# Patient Record
Sex: FEMALE | Race: WHITE | NOT HISPANIC OR LATINO | URBAN - METROPOLITAN AREA
[De-identification: names, ages, dates, MRNs, and addresses within clinical notes are randomized per-mention and may not be internally consistent; named-entity substitution may affect disease eponyms.]

---

## 2021-04-05 ENCOUNTER — APPOINTMENT (OUTPATIENT)
Age: 86
Setting detail: DERMATOLOGY
End: 2021-04-07

## 2021-04-05 DIAGNOSIS — L82.1 OTHER SEBORRHEIC KERATOSIS: ICD-10-CM

## 2021-04-05 DIAGNOSIS — Z86.19 PERSONAL HISTORY OF OTHER INFECTIOUS AND PARASITIC DISEASES: ICD-10-CM

## 2021-04-05 DIAGNOSIS — L23.9 ALLERGIC CONTACT DERMATITIS, UNSPECIFIED CAUSE: ICD-10-CM

## 2021-04-05 DIAGNOSIS — L57.8 OTHER SKIN CHANGES DUE TO CHRONIC EXPOSURE TO NONIONIZING RADIATION: ICD-10-CM

## 2021-04-05 PROBLEM — C44.729 SQUAMOUS CELL CARCINOMA OF SKIN OF LEFT LOWER LIMB, INCLUDING HIP: Status: ACTIVE | Noted: 2021-04-05

## 2021-04-05 PROCEDURE — OTHER COUNSELING: OTHER

## 2021-04-05 PROCEDURE — OTHER CONSULTATION FOR MOHS SURGERY: OTHER

## 2021-04-05 PROCEDURE — OTHER OTHER: OTHER

## 2021-04-05 PROCEDURE — OTHER MIPS QUALITY: OTHER

## 2021-04-05 PROCEDURE — OTHER PRESCRIPTION: OTHER

## 2021-04-05 PROCEDURE — 99203 OFFICE O/P NEW LOW 30 MIN: CPT

## 2021-04-05 RX ORDER — FLUOCINOLONE ACETONIDE 0.1 MG/ML
SOLUTION TOPICAL
Qty: 1 | Refills: 2 | Status: ERX | COMMUNITY
Start: 2021-04-05

## 2021-04-05 ASSESSMENT — PAIN INTENSITY VAS: HOW INTENSE IS YOUR PAIN 0 BEING NO PAIN, 10 BEING THE MOST SEVERE PAIN POSSIBLE?: 1/10 PAIN

## 2021-04-05 ASSESSMENT — LOCATION ZONE DERM
LOCATION ZONE: ARM
LOCATION ZONE: FACE
LOCATION ZONE: SCALP

## 2021-04-05 ASSESSMENT — LOCATION SIMPLE DESCRIPTION DERM
LOCATION SIMPLE: RIGHT FOREARM
LOCATION SIMPLE: SCALP
LOCATION SIMPLE: LEFT FOREARM
LOCATION SIMPLE: INFERIOR FOREHEAD
LOCATION SIMPLE: RIGHT SCALP
LOCATION SIMPLE: V1 LEFT ANTERIOR DERMATOME

## 2021-04-05 ASSESSMENT — LOCATION DETAILED DESCRIPTION DERM
LOCATION DETAILED: INFERIOR MID FOREHEAD
LOCATION DETAILED: LEFT SUPERIOR PARIETAL SCALP
LOCATION DETAILED: LEFT DISTAL DORSAL FOREARM
LOCATION DETAILED: LEFT CENTRAL PARIETAL SCALP
LOCATION DETAILED: RIGHT DISTAL DORSAL FOREARM
LOCATION DETAILED: V1 LEFT ANTERIOR DERMATOME (OPHTHALMIC)
LOCATION DETAILED: RIGHT MEDIAL FRONTAL SCALP

## 2021-04-05 NOTE — PROCEDURE: COUNSELING
Detail Level: Zone
Moisturizer Recommendations: CERAVE CREAM
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 I ADVISED HER THAT IF SHE STOPS THE ROGAINE (AND IS WILLING TO STAY OFF IT) THEN SHE WILL LIKELY REDUCE HER PAIN OVER TIME AND GO BACK TO THE OLD DOSE OF THE GABAPENTIN.\\n\\nTHE PATIENT IS NOT SURE IF SHE WANTS TO DO A \"MEDICATION FOR LIFE\" LIKE MINOXIDIL, SO I REMINDED HER THAT SHE IS MORE THAN WELCOME TO STOP.\\n\\Adrian THE END OF THE CONVERSATION I REMINDED HER THAT SHE DOES NOT NEED TO MAINTAIN THE MINOXIDIL, ALTHOUGH IF SHE WISHES TO CONTINUE TO TRY IT  = THEN SHE SHOULD TRY IT CONCOMITANTLY WITH THE SYNALAR RX TO SEE IF THE PAIN SYMPTOMS MITIGATE.\\n\\n
Detail Level: Simple
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
Patient Specific Counseling (Will Not Stick From Patient To Patient): .\\n\\n\\nADVISED PATIENT TO SCHEDULE A FULL SKIN EXAM WITH ONE OF OUR GENERAL DERMATOLOGY PROVIDERS IF SHE WISHES TO GET A FEW OTHER SPOTS LOOKED AT AND THEY CAN ADDRESS THE OTHER LESIONS.\\n\\n
Shampoo Recommendations: HEAD AND SHOULDERS

## 2021-04-05 NOTE — PROCEDURE: OTHER
Detail Level: Detailed
Other (Free Text): PATIENT WISHES TO HAVE THESE REMOVED IN THE FUTURE. \\n\\n
Note Text (......Xxx Chief Complaint.): This diagnosis correlates with the
Render Risk Assessment In Note?: no

## 2021-04-05 NOTE — HPI: MOHS SURGERY CONSULTATION
Has The Cancer Been Biopsied Before?: has been previously biopsied
Who Is Your Referring Provider?: Mary Ellen HUGHESC

## 2021-04-14 ENCOUNTER — APPOINTMENT (OUTPATIENT)
Age: 86
Setting detail: DERMATOLOGY
End: 2021-04-18

## 2021-04-14 ENCOUNTER — APPOINTMENT (OUTPATIENT)
Age: 86
Setting detail: DERMATOLOGY
End: 2021-04-15

## 2021-04-14 DIAGNOSIS — Z48.1 ENCOUNTER FOR PLANNED POSTPROCEDURAL WOUND CLOSURE: ICD-10-CM

## 2021-04-14 PROBLEM — C44.92 SQUAMOUS CELL CARCINOMA OF SKIN, UNSPECIFIED: Status: ACTIVE | Noted: 2021-04-14

## 2021-04-14 PROBLEM — C44.729 SQUAMOUS CELL CARCINOMA OF SKIN OF LEFT LOWER LIMB, INCLUDING HIP: Status: ACTIVE | Noted: 2021-04-14

## 2021-04-14 PROCEDURE — OTHER CONSULTATION FOR SURGICAL REPAIR: OTHER

## 2021-04-14 PROCEDURE — OTHER CONSULTATION FOR MOHS SURGERY: OTHER

## 2021-04-14 PROCEDURE — 17313 MOHS 1 STAGE T/A/L: CPT

## 2021-04-14 PROCEDURE — 13121 CMPLX RPR S/A/L 2.6-7.5 CM: CPT

## 2021-04-14 PROCEDURE — OTHER COUNSELING: OTHER

## 2021-04-14 PROCEDURE — OTHER MOHS SURGERY: OTHER

## 2021-04-14 PROCEDURE — OTHER REPAIR NOTE: OTHER

## 2021-04-14 ASSESSMENT — LOCATION DETAILED DESCRIPTION DERM: LOCATION DETAILED: LEFT DISTAL PRETIBIAL REGION

## 2021-04-14 ASSESSMENT — LOCATION ZONE DERM: LOCATION ZONE: LEG

## 2021-04-14 ASSESSMENT — LOCATION SIMPLE DESCRIPTION DERM: LOCATION SIMPLE: LEFT PRETIBIAL REGION

## 2021-04-14 NOTE — PROCEDURE: REPAIR NOTE
[facility] Cardiology Cardiology Cardiology Intervent Cardiology Intervent Cardiology Pulmonology Pulmonology Pulmonology Neurology Pulmonology Cardiology Closure 4 Information: This tab is for additional flaps and grafts above and beyond our usual structured repairs.  Please note if you enter information here it will not currently bill and you will need to add the billing information manually.

## 2021-04-29 ENCOUNTER — APPOINTMENT (OUTPATIENT)
Age: 86
Setting detail: DERMATOLOGY
End: 2021-05-09

## 2021-04-29 ENCOUNTER — APPOINTMENT (OUTPATIENT)
Age: 86
Setting detail: DERMATOLOGY
End: 2021-04-29

## 2021-04-29 DIAGNOSIS — Z48.817 ENCOUNTER FOR SURGICAL AFTERCARE FOLLOWING SURGERY ON THE SKIN AND SUBCUTANEOUS TISSUE: ICD-10-CM

## 2021-04-29 DIAGNOSIS — Z48.02 ENCOUNTER FOR REMOVAL OF SUTURES: ICD-10-CM

## 2021-04-29 PROCEDURE — OTHER SUTURE REMOVAL (GLOBAL PERIOD): OTHER

## 2021-04-29 PROCEDURE — OTHER POST-OP WOUND EVALUATION: OTHER

## 2021-04-29 PROCEDURE — OTHER COUNSELING: OTHER

## 2021-04-29 PROCEDURE — 99212 OFFICE O/P EST SF 10 MIN: CPT

## 2021-04-29 PROCEDURE — OTHER DEFER: OTHER

## 2021-04-29 ASSESSMENT — LOCATION DETAILED DESCRIPTION DERM
LOCATION DETAILED: LEFT DISTAL PRETIBIAL REGION
LOCATION DETAILED: LEFT DISTAL PRETIBIAL REGION

## 2021-04-29 ASSESSMENT — LOCATION ZONE DERM
LOCATION ZONE: LEG
LOCATION ZONE: LEG

## 2021-04-29 ASSESSMENT — LOCATION SIMPLE DESCRIPTION DERM
LOCATION SIMPLE: LEFT PRETIBIAL REGION
LOCATION SIMPLE: LEFT PRETIBIAL REGION

## 2021-04-29 NOTE — PROCEDURE: SUTURE REMOVAL (GLOBAL PERIOD)
Add 04283 Cpt? (Important Note: In 2017 The Use Of 23424 Is Being Tracked By Cms To Determine Future Global Period Reimbursement For Global Periods): no
Detail Level: Detailed

## 2021-04-29 NOTE — PROCEDURE: DEFER
Scheduling Instructions (Optional): Dr. Bernal
Detail Level: Detailed
Introduction Text (Please End With A Colon): The following procedure was deferred:

## 2021-04-29 NOTE — PROCEDURE: POST-OP WOUND EVALUATION
Wound Diameter In Cm(Optional): 0
Detail Level: Detailed
Wound Color?: pink
Wound Evaluated By (Optional): Boni Bernal MD
Wound Crusting?: clean
Patient To Follow-Up With?: our clinic

## 2021-05-11 ENCOUNTER — APPOINTMENT (OUTPATIENT)
Age: 86
Setting detail: DERMATOLOGY
End: 2021-05-23

## 2021-05-11 DIAGNOSIS — T81.89 OTHER COMPLICATIONS OF PROCEDURES, NOT ELSEWHERE CLASSIFIED: ICD-10-CM

## 2021-05-11 PROBLEM — T81.89XA OTHER COMPLICATIONS OF PROCEDURES, NOT ELSEWHERE CLASSIFIED, INITIAL ENCOUNTER: Status: ACTIVE | Noted: 2021-05-11

## 2021-05-11 PROCEDURE — OTHER ORDER TESTS: OTHER

## 2021-05-11 PROCEDURE — OTHER DEBRIDEMENT OF OPEN WOUND: OTHER

## 2021-05-11 PROCEDURE — OTHER PRESCRIPTION: OTHER

## 2021-05-11 PROCEDURE — OTHER OTHER: OTHER

## 2021-05-11 PROCEDURE — 99212 OFFICE O/P EST SF 10 MIN: CPT | Mod: 25

## 2021-05-11 PROCEDURE — 97597 DBRDMT OPN WND 1ST 20 CM/<: CPT

## 2021-05-11 RX ORDER — MUPIROCIN 20 MG/G
OINTMENT TOPICAL
Qty: 1 | Refills: 0 | Status: ERX | COMMUNITY
Start: 2021-05-11

## 2021-05-11 RX ORDER — DOXYCYCLINE HYCLATE 100 MG/1
TABLET, COATED ORAL
Qty: 20 | Refills: 0 | Status: ERX | COMMUNITY
Start: 2021-05-11

## 2021-05-11 RX ORDER — HYDROCODONE BITARTRATE AND ACETAMINOPHEN 5; 325 MG/1; MG/1
TABLET ORAL
Qty: 20 | Refills: 0 | Status: ERX | COMMUNITY
Start: 2021-05-11

## 2021-05-11 ASSESSMENT — LOCATION DETAILED DESCRIPTION DERM: LOCATION DETAILED: LEFT DISTAL PRETIBIAL REGION

## 2021-05-11 ASSESSMENT — LOCATION SIMPLE DESCRIPTION DERM: LOCATION SIMPLE: LEFT PRETIBIAL REGION

## 2021-05-11 ASSESSMENT — LOCATION ZONE DERM: LOCATION ZONE: LEG

## 2021-05-11 NOTE — PROCEDURE: DEBRIDEMENT OF OPEN WOUND
Size Of Wound In Cm2 (Optional): 0
Anesthesia Volume In Cc (Will Not Render If 0): 5
Post-Care Instructions: I reviewed with the patient in detail post-care instructions. Patient is to keep the area dry for 48 hours, and not to engage in any swimming until the area is healed. Should the patient develop any fevers, chills, bleeding, severe pain patient will contact the office immediately.
Consent was obtained from the patient. The risks, benefits and alternatives to therapy were discussed in detail. Specifically, the risks of infection, scarring, bleeding, prolonged wound healing, nerve injury, and allergy to anesthesia were addressed. Alternatives to debridement, such as aggressive wound care, were also discussed.  Prior to the procedure, the treatment site was clearly identified and confirmed by the patient. All components of Universal Protocol/PAUSE Rule completed.
Procedure: Debridement of wound tissue less than 20sq cm
Detail Level: Detailed
Anesthesia Type: 1% lidocaine with epinephrine
Render Post-Care Instructions In Note?: no

## 2021-05-11 NOTE — PROCEDURE: OTHER
Render Risk Assessment In Note?: no
Detail Level: Detailed
Other (Free Text): Patient states wound became very red and irritated over the last 4-5 days. She does state that it has been uncomfortable since the surgery. She states she has not been very compliant with reducing activity as she is moving and needing to assist with packing.\\n\\nAfter examination, I discussed that there is likely some degree of infection and after further discussion she admits to not washing her leg since her surgery. Her  was in the room during the exam and was very argumentative. He demanded to know why our instruments were not sterile and that this is not acceptable. \\n\\nI discussed that infection is a well known complication to surgery and we had to go over the original consent form that Ms. Delgado's signed prior to her procedure, which clearly states infection and wound healing as possible complications.\\n\\nWill debride wound today and refer to  wound care clinic and possible HBO to expedite healing and clearance of infection. Will start topical and PO antibiotics. Wound cultured today.\\n\\nFollow up next week, or sooner for any worsening.
Note Text (......Xxx Chief Complaint.): This diagnosis correlates with the

## 2021-05-12 ENCOUNTER — RX ONLY (RX ONLY)
Age: 86
End: 2021-05-12

## 2021-05-12 RX ORDER — HYDROMORPHONE HYDROCHLORIDE 2 MG/1
TABLET ORAL
Qty: 20 | Refills: 0 | Status: ERX | COMMUNITY
Start: 2021-05-12

## 2021-10-05 NOTE — PROCEDURE: CONSULTATION FOR SURGICAL REPAIR
Detail Level: Detailed
Patient requests all Lab, Cardiology, and Radiology Results on their Discharge Instructions
Size Of Defect: 1.9
X Size Of Defect In Cm (Optional): 1.2